# Patient Record
Sex: MALE | Race: WHITE | NOT HISPANIC OR LATINO
[De-identification: names, ages, dates, MRNs, and addresses within clinical notes are randomized per-mention and may not be internally consistent; named-entity substitution may affect disease eponyms.]

---

## 2017-01-04 ENCOUNTER — APPOINTMENT (OUTPATIENT)
Dept: ORTHOPEDIC SURGERY | Facility: CLINIC | Age: 22
End: 2017-01-04

## 2017-01-18 ENCOUNTER — APPOINTMENT (OUTPATIENT)
Dept: ORTHOPEDIC SURGERY | Facility: CLINIC | Age: 22
End: 2017-01-18

## 2022-05-17 ENCOUNTER — APPOINTMENT (OUTPATIENT)
Dept: ORTHOPEDIC SURGERY | Facility: CLINIC | Age: 27
End: 2022-05-17
Payer: SELF-PAY

## 2022-05-17 VITALS
OXYGEN SATURATION: 98 % | BODY MASS INDEX: 26.48 KG/M2 | SYSTOLIC BLOOD PRESSURE: 126 MMHG | HEART RATE: 62 BPM | DIASTOLIC BLOOD PRESSURE: 77 MMHG | HEIGHT: 70 IN | WEIGHT: 185 LBS | TEMPERATURE: 98.3 F

## 2022-05-17 DIAGNOSIS — M22.2X1 PATELLOFEMORAL DISORDERS, RIGHT KNEE: ICD-10-CM

## 2022-05-17 PROCEDURE — 99203 OFFICE O/P NEW LOW 30 MIN: CPT

## 2022-05-17 RX ORDER — MELOXICAM 15 MG/1
15 TABLET ORAL
Qty: 30 | Refills: 2 | Status: ACTIVE | COMMUNITY
Start: 2022-05-17 | End: 1900-01-01

## 2022-05-20 NOTE — PHYSICAL EXAM
[de-identified] : The patient is a well developed, well nourished male in no apparent distress. He is alert and oriented X 3 with a pleasant mood and appropriate affect.\par \par On physical examination of the right knee, there is full range of motion. The patient walks with a normal gait and stands in neutral alignment. There is no effusion. No warmth or erythema is noted. The patella is tender to palpation medially . There is patellofemoral crepitus noted. The apprehension and grind tests are negative. The extensor mechanism is intact. There is no joint line tenderness. The Chucho sign is absent. The Lachman and pivot shift tests are negative. There is no varus or valgus laxity at 0 or 30 degrees. No posterolateral or anteromedial laxity is noted. No masses are palpable. No other soft tissue or bony tenderness is noted. There is some tenderness noted on palpation of the IT band. Quadriceps weakness is noted. Neurovascular function is intact. [de-identified] : Radiographs show surgical hardware consistent with right ACL reconstruction--joint space well maintained\par \par MRi of the right knee shows mild medial fat pad inflammation

## 2022-05-20 NOTE — HISTORY OF PRESENT ILLNESS
[de-identified] : Chavez Bray is an active 25 yo gentleman who presents with ongoing right knee issues since October.he had right knee ACL reconstruction in 2015 and an arthroscopic lysis of adhesions in 2016. 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 In October he felt a weirdpain in the anterior aspect of his right knee while playing basketball.he denies any swelling or instability. he has had persistent and localized pain. He has started a home PT program without much relief. he has not taken NSAIDS. He has been unable to resume basketball due to pain. he had xrays and an MRI and presents for further evaluation. he denies any locking or buckling.

## 2022-05-20 NOTE — DISCUSSION/SUMMARY
[de-identified] : Chavez has persistent anterior knee pain since October. There is an area of localized inflammation near the medial facet of the patella. he will begin a course of Mobic 15 mg daily for the next month. he will increase his activities as tolerated and avoid squats and lunges. He will return on an as needed basis. he will call if any issues arise yes

## 2022-05-20 NOTE — END OF VISIT
[[email or web address]] : All medical record entries made by JEREMY Tracy, acting as a scribe for this encounter under the direction of Tutu Harris MD . I have reviewed the chart and agree that the record accurately reflects my personal performance of the history, physical exam, assessment and plan. I have also personally directed, reviewed, and agreed with the chart.